# Patient Record
Sex: FEMALE | Race: AMERICAN INDIAN OR ALASKA NATIVE
[De-identification: names, ages, dates, MRNs, and addresses within clinical notes are randomized per-mention and may not be internally consistent; named-entity substitution may affect disease eponyms.]

---

## 2021-05-28 ENCOUNTER — HOSPITAL ENCOUNTER (INPATIENT)
Dept: HOSPITAL 43 - DL.MS | Age: 71
LOS: 33 days | Discharge: HOME | DRG: 862 | End: 2021-06-30
Attending: INTERNAL MEDICINE | Admitting: INTERNAL MEDICINE
Payer: COMMERCIAL

## 2021-05-28 DIAGNOSIS — G62.9: ICD-10-CM

## 2021-05-28 DIAGNOSIS — N18.9: ICD-10-CM

## 2021-05-28 DIAGNOSIS — I13.0: ICD-10-CM

## 2021-05-28 DIAGNOSIS — Z28.82: ICD-10-CM

## 2021-05-28 DIAGNOSIS — I48.91: ICD-10-CM

## 2021-05-28 DIAGNOSIS — Z86.16: ICD-10-CM

## 2021-05-28 DIAGNOSIS — D50.9: ICD-10-CM

## 2021-05-28 DIAGNOSIS — M06.9: ICD-10-CM

## 2021-05-28 DIAGNOSIS — T84.020D: Primary | ICD-10-CM

## 2021-05-28 DIAGNOSIS — Z87.891: ICD-10-CM

## 2021-05-28 DIAGNOSIS — Z88.5: ICD-10-CM

## 2021-05-28 DIAGNOSIS — Z86.73: ICD-10-CM

## 2021-05-28 DIAGNOSIS — I50.9: ICD-10-CM

## 2021-05-28 DIAGNOSIS — F32.9: ICD-10-CM

## 2021-05-28 DIAGNOSIS — J44.9: ICD-10-CM

## 2021-05-28 DIAGNOSIS — M81.0: ICD-10-CM

## 2021-05-28 DIAGNOSIS — Z88.8: ICD-10-CM

## 2021-05-28 DIAGNOSIS — E78.5: ICD-10-CM

## 2021-05-28 DIAGNOSIS — K21.9: ICD-10-CM

## 2021-05-28 DIAGNOSIS — Z90.49: ICD-10-CM

## 2021-05-28 DIAGNOSIS — Z79.82: ICD-10-CM

## 2021-05-28 DIAGNOSIS — F41.9: ICD-10-CM

## 2021-05-28 DIAGNOSIS — Z79.899: ICD-10-CM

## 2021-05-28 RX ADMIN — Medication SCH TAB: at 17:59

## 2021-05-28 NOTE — PCM.HP
H&P History of Present Illness





- General


Date of Service: 05/28/21


Admit Problem/Dx: 


                           Admission Diagnosis/Problem





Admission Diagnosis/Problem      Dislocation of hip joint prosthesis











- History of Present Illness


Initial Comments - Free Text/Narative: 





70F w/ pmh afib not on a/c, COPD, hx CVA, anxiety, osteoporosis, thoracic aortic

aneurysm s/p sleeve, AAA, HT, complicated course s/p R hip replacement leading 

to 8x dislocations s/p prior revision and now again s/p revision surger 5/24 

transferred to  for Swing Bed.  Pt is deconditioned and cannot ambulate much. 

Can stand w/ assistance.  Pain is minor.  No other complaints.





- Related Data


Allergies/Adverse Reactions: 


                                    Allergies











Allergy/AdvReac Type Severity Reaction Status Date / Time


 


adalimumab Allergy  Other Verified 05/28/21 10:39


 


oxycodone Allergy  Hyperactivi Verified 05/28/21 10:38





   ty  











Home Medications: 


                                    Home Meds





Acetaminophen 1,000 mg PO Q8HR 05/28/21 [History]


Albuterol/Ipratropium [DuoNeb 3.0-0.5 MG/3 ML] 1 ampule PO Q4HR PRN 05/28/21 

[History]


Aspirin [Ecotrin EC] 325 mg PO DAILY 05/28/21 [History]


Calcium Carbonate/Vitamin D3 [Calcium 500-Vit D3 200 Caplet] 2 tab PO BIDMEALS 

05/28/21 [History]


DULoxetine [Cymbalta] 60 mg PO BID 05/28/21 [History]


Ergocalciferol (Vitamin D2) [Vitamin D2] 50,000 unit PO .TUES.THURS 05/28/21 

[History]


Famotidine 10 mg PO BID PRN 05/28/21 [History]


Ferrous Sulfate 325 mg PO BIDMEALS 05/28/21 [History]


Folic Acid 1 mg PO DAILY 05/28/21 [History]


Furosemide [Lasix] 20 mg PO DAILY 05/28/21 [History]


Hydroxychloroquine [Plaquenil] 200 mg PO BID 05/28/21 [History]


Ibuprofen 800 mg PO Q8HR 05/28/21 [History]


Lidocaine 5% [Lidoderm 5%] 2 patch TOP Q24H 05/28/21 [History]


Loperamide [Imodium] 2 mg PO QID PRN 05/28/21 [History]


Meloxicam [Mobic] 15 mg PO BID 05/28/21 [History]


Metoprolol Tartrate 25 mg PO BID 05/28/21 [History]


Multivit-Min/FA/Lycopen/Lutein [Centrum Silver Tablet] 1 tab PO DAILY 05/28/21 

[History]


Pantoprazole Sodium [Protonix] 40 mg PO DAILY 05/28/21 [History]


Potassium Chloride [Klor-Con M20] 20 meq PO DAILY 05/28/21 [History]


Pregabalin [Lyrica] 150 mg PO BID 05/28/21 [History]


atorvaSTATin [Lipitor] 40 mg PO BEDTIME 05/28/21 [History]


carvediloL [Carvedilol] 6.25 mg PO BID 05/28/21 [History]


hydroCHLOROthiazide [Hydrochlorothiazide] 50 mg PO DAILY 05/28/21 [History]


lisinopriL [Lisinopril] 10 mg PO DAILY 05/28/21 [History]


oxyCODONE 5 mg PO Q6HR PRN 05/28/21 [History]


traMADol [Ultram] 50 mg PO Q6HR PRN 05/28/21 [History]











Past Medical History


Cardiovascular History: Reports: Afib, Heart Failure, Other (See Below)


Other Cardiovascular History: Tripple, sleeve on aorta


Respiratory History: Reports: COPD


Gastrointestinal History: Reports: GERD


Genitourinary History: Reports: Chronic Renal Insuffiency


OB/GYN History: Reports: Pregnancy


Musculoskeletal History: Reports: Arthritis, RA


Neurological History: Reports: TIA


Psychiatric History: Reports: Depression


Hematologic History: Reports: Anemia, Folic Acid, Iron Deficiency





- Infectious Disease History


Infectious Disease History: Reports: Other (See Below)


Other Infectious Disease History: COVID





- Past Surgical History


Cardiovascular Surgical History: Reports: None


Respiratory Surgical History: Reports: None


GI Surgical History: Reports: Appendectomy, Cholecystectomy


Female  Surgical History: Reports: None, Other (See Below)


Other Female  Surgeries/Procedures: Recurrent UTI


Musculoskeletal Surgical History: Reports: Hip Replacement, Joint Replacement, 

Other (See Below)


Other Musculoskeletal Surgeries/Procedures:: 8 dislocation of right hip





Social & Family History





- Tobacco Use


Tobacco Use Status *Q: Former Tobacco User


Years of Tobacco use: 40


Packs/Tins Daily: 1.5


Used Tobacco, but Quit: Yes


Month/Year Tobacco Last Used: 2020


Second Hand Smoke Exposure: No





- Caffeine Use


Caffeine Use: Reports: Coffee, Soda





- Recreational Drug Use


Recreational Drug Use: No





H&P Review of Systems





- Review of Systems:


Review Of Systems: See Below


General: Reports: Weakness.  Denies: Fever


HEENT: Denies: Headaches


Pulmonary: Denies: Shortness of Breath


Cardiovascular: Denies: Chest Pain, Edema


Gastrointestinal: Denies: Abdominal Pain, Nausea, Vomiting


Genitourinary: Denies: Dysuria


Musculoskeletal: Denies: Neck Pain


Skin: Denies: Jaundice


Psychiatric: Denies: Confusion


Neurological: Reports: Dizziness (at times)


Hematologic/Lymphatic: Denies: Easy Bleeding





Exam





- Exam


Exam: See Below





- Vital Signs


Vital Signs: 


                                Last Vital Signs











Temp  97.1 F   05/28/21 12:42


 


Pulse  66   05/28/21 12:42


 


Resp  18   05/28/21 12:42


 


BP  130/66   05/28/21 12:42


 


Pulse Ox  95   05/28/21 12:42











Weight: 161 lb 3.2 oz





- Exam


Quality Assessment: No: Supplemental Oxygen


General: Alert, Oriented


HEENT: Conjunctiva Clear


Neck: Supple


Lungs: Clear to Auscultation, Normal Respiratory Effort


Cardiovascular: Regular Rate, Regular Rhythm


GI/Abdominal Exam: Normal Bowel Sounds, Soft, Non-Tender, No Distention


Back Exam: Normal Inspection


Extremities: No Pedal Edema


Skin: Warm, Dry, Intact


Neurological: Normal Speech


Neuro Extensive - Mental Status: Alert, Oriented x3, Normal Mood/Affect


Neuro Extensive - Motor, Sensory, Reflexes: No: Tremor


Psychiatric: Alert, Normal Affect, Normal Mood


Physical Exam Comments:: 





R hip dressing clean, no visible bruising


Problem List Initiated/Reviewed/Updated: Yes


Orders Last 24hrs: 


                               Active Orders 24 hr











 Category Date Time Status


 


 Patient Status [ADT] Routine ADT  05/28/21 13:05 Ordered


 


 Oxygen Therapy [RC] PRN Care  05/28/21 13:05 Ordered


 


 RT Aerosol Therapy [RC] ASDIRECTED Care  05/28/21 13:18 Ordered


 


 Up With Assistance [RC] ASDIRECTED Care  05/28/21 13:05 Ordered


 


 VTE/DVT Education [RC] PER UNIT ROUTINE Care  05/28/21 13:05 Ordered


 


 Vital Signs [RC] Q4H Care  05/28/21 13:05 Ordered


 


 OT Evaluation and Treatment [CONS] Routine Cons  05/28/21 13:05 Ordered


 


 PT Evaluation and Treatment [CONS] Routine Cons  05/28/21 13:05 Ordered


 


 Heart Healthy Diet [DIET] Diet  05/28/21 Dinner Ordered


 


 CBC W/O DIFF,HEMOGRAM [HEME] AM Lab  05/29/21 05:11 Ordered


 


 Acetaminophen [Tylenol Extra Strength] Med  05/28/21 14:00 Ordered





 1,000 mg PO Q8HR   


 


 Albuterol/Ipratropium [DuoNeb 3.0-0.5 MG/3 ML] Med  05/28/21 13:08 Ordered





 1 ampule INH Q4HR PRN   


 


 Aspirin [Ecotrin] Med  05/29/21 09:00 Ordered





 325 mg PO DAILY   


 


 Calcium Carbonate/Vitamin D3 [Calcium Carbonate/Vitamin Med  05/28/21 18:00 

Ordered





 D 1250 MG - 5 MCG]   





 2 tab PO BIDMEALS   


 


 DULoxetine [Cymbalta] Med  05/28/21 21:00 Ordered





 60 mg PO BID   


 


 Enoxaparin [Lovenox] Med  05/29/21 09:00 Ordered





 40 mg SUBCUT DAILY   


 


 Ergocalciferol (Vitamin D2) [Vitamin D2] Med  05/28/21 13:15 Ordered





 50,000 unit PO .LUIS   


 


 Famotidine [Famotidine] Med  05/28/21 13:08 Ordered





 10 mg PO BID PRN   


 


 Ferrous Sulfate Med  05/28/21 18:00 Ordered





 325 mg PO BIDMEALS   


 


 Folic Acid Med  05/29/21 09:00 Ordered





 1 mg PO DAILY   


 


 Furosemide [Lasix] Med  05/29/21 09:00 Ordered





 20 mg PO DAILY   


 


 Hydroxychloroquine [Plaquenil] Med  05/28/21 21:00 Ordered





 200 mg PO BID   


 


 Lidocaine 5% [Lidoderm 5%] Med  05/28/21 13:15 Ordered





 2 patch TOP Q24H   


 


 Metoprolol Tartrate [Lopressor] Med  05/28/21 21:00 Ordered





 25 mg PO BID   


 


 Multivit-Min/FA/Lycopen/Lutein [Centrum Silver Tablet] Med  05/29/21 09:00 

Ordered





 1 tab PO DAILY   


 


 Pantoprazole [ProTONIX***] Med  05/29/21 09:00 Ordered





 40 mg PO DAILY   


 


 Potassium Chloride [Klor-Con M20] Med  05/29/21 09:00 Ordered





 20 meq PO DAILY   


 


 Pregabalin [Lyrica] Med  05/28/21 21:00 Ordered





 150 mg PO BID   


 


 atorvaSTATin [Lipitor] Med  05/28/21 21:00 Ordered





 40 mg PO BEDTIME   


 


 hydroCHLOROthiazide [Hydrochlorothiazide] Med  05/29/21 09:00 Ordered





 50 mg PO DAILY   


 


 lisinopriL [Prinivil] Med  05/29/21 09:00 Ordered





 10 mg PO DAILY   


 


 traMADol [Ultram] Med  05/28/21 13:14 Ordered





 50 mg PO Q6HR PRN   


 


 Resuscitation Status Routine Resus Stat  05/28/21 13:05 Ordered











Assessment/Plan Comment:: 





#s/p right hip prosthesis revision - uncomplicated course - mgt per PT/OT - pain

 control





#COPD / anxiety / HT / HL - will d/w daughter the med rec which states pt is on 

lopressor and coreg and daughter told nurse this is indeed true - appears to be 

a mistake to me





#afib - historically not a/c - daughter unclear why - they state pt was on 

eliquis for 3 mo then told to stop by PMD





PPX - LMWH

## 2021-05-29 RX ADMIN — POTASSIUM CHLORIDE SCH MEQ: 750 TABLET, FILM COATED, EXTENDED RELEASE ORAL at 09:24

## 2021-05-29 RX ADMIN — Medication SCH TAB: at 08:31

## 2021-05-29 RX ADMIN — Medication SCH TAB: at 09:24

## 2021-05-29 RX ADMIN — ASPIRIN SCH MG: 325 TABLET, DELAYED RELEASE ORAL at 09:25

## 2021-05-29 RX ADMIN — Medication SCH TAB: at 17:55

## 2021-05-30 RX ADMIN — Medication SCH TAB: at 09:28

## 2021-05-30 RX ADMIN — Medication SCH TAB: at 18:24

## 2021-05-30 RX ADMIN — ASPIRIN SCH MG: 325 TABLET, DELAYED RELEASE ORAL at 09:28

## 2021-05-30 RX ADMIN — POTASSIUM CHLORIDE SCH MEQ: 750 TABLET, FILM COATED, EXTENDED RELEASE ORAL at 09:28

## 2021-05-31 RX ADMIN — Medication SCH TAB: at 09:37

## 2021-05-31 RX ADMIN — ASPIRIN SCH MG: 325 TABLET, DELAYED RELEASE ORAL at 09:36

## 2021-05-31 RX ADMIN — POTASSIUM CHLORIDE SCH MEQ: 750 TABLET, FILM COATED, EXTENDED RELEASE ORAL at 09:37

## 2021-05-31 RX ADMIN — Medication SCH TAB: at 17:41

## 2021-06-01 LAB
ANION GAP SERPL CALC-SCNC: 10.9 MEQ/L (ref 7–13)
CHLORIDE SERPL-SCNC: 100 MMOL/L (ref 98–107)
SODIUM SERPL-SCNC: 138 MMOL/L (ref 136–145)

## 2021-06-01 RX ADMIN — Medication SCH TAB: at 09:15

## 2021-06-01 RX ADMIN — Medication SCH TAB: at 18:18

## 2021-06-01 RX ADMIN — Medication SCH TAB: at 08:18

## 2021-06-01 RX ADMIN — ERGOCALCIFEROL SCH MG: 1.25 CAPSULE ORAL at 09:13

## 2021-06-01 RX ADMIN — ASPIRIN SCH MG: 325 TABLET, DELAYED RELEASE ORAL at 09:14

## 2021-06-01 RX ADMIN — POTASSIUM CHLORIDE SCH MEQ: 750 TABLET, FILM COATED, EXTENDED RELEASE ORAL at 09:13

## 2021-06-02 RX ADMIN — Medication SCH TAB: at 18:43

## 2021-06-02 RX ADMIN — ASPIRIN SCH MG: 325 TABLET, DELAYED RELEASE ORAL at 09:13

## 2021-06-02 RX ADMIN — Medication SCH TAB: at 09:15

## 2021-06-02 RX ADMIN — Medication SCH TAB: at 09:13

## 2021-06-02 RX ADMIN — POTASSIUM CHLORIDE SCH MEQ: 750 TABLET, FILM COATED, EXTENDED RELEASE ORAL at 09:13

## 2021-06-03 RX ADMIN — Medication SCH TAB: at 11:08

## 2021-06-03 RX ADMIN — Medication SCH TAB: at 18:38

## 2021-06-03 RX ADMIN — ASPIRIN SCH MG: 325 TABLET, DELAYED RELEASE ORAL at 11:05

## 2021-06-03 RX ADMIN — POTASSIUM CHLORIDE SCH MEQ: 750 TABLET, FILM COATED, EXTENDED RELEASE ORAL at 11:06

## 2021-06-03 RX ADMIN — Medication SCH TAB: at 11:06

## 2021-06-03 RX ADMIN — ERGOCALCIFEROL SCH MG: 1.25 CAPSULE ORAL at 11:07

## 2021-06-04 RX ADMIN — ASPIRIN SCH MG: 325 TABLET, DELAYED RELEASE ORAL at 09:42

## 2021-06-04 RX ADMIN — POTASSIUM CHLORIDE SCH MEQ: 750 TABLET, FILM COATED, EXTENDED RELEASE ORAL at 09:44

## 2021-06-04 RX ADMIN — Medication SCH TAB: at 18:07

## 2021-06-04 RX ADMIN — Medication SCH TAB: at 09:41

## 2021-06-04 RX ADMIN — Medication SCH TAB: at 09:40

## 2021-06-04 NOTE — PCM.SN.2
- Free Text/Narrative


Note: 





START OF DOCTOR ANUSHA PROGRESS NOTE














Subjective:


The patient endorses no complaints at this time.  She denies pain of her right 

hip.  She denies fever, rigors, nausea, vomiting, cough, wheeze, abdominal pain,

chest pain, dyspnea.  She states that she has been having a good appetite and 

that she has been moving her bowels regularly.  She indicates that she has been 

working with physical therapy/occupational therapy and she is happy with her 

progress.  I explained to the patient her current medical condition and plan of 

care and I have answered all of her questions











Objective:





General:


-Alert


-No acute distress


-No dyspnea


-No tachypnea





Heart:


-Regular rate


-iRegular rhythm


-No murmurs


-No gallops


-No rubs





Lungs:


-No wheeze


-No rhonchi


-No rales





Abdomen:


-Normal bowel sounds in all four quadrants


-No rebound


-No guarding


-No tenderness





Extremities:


-2/4 pulse in all four extremities


-No clubbing


-No cyanosis


-No edema





Additional Details / Additional Findings / Exceptions / Miscellaneous:


The patient has 5 out of 5 right foot plantar flexion/dorsiflexion strength.  

The patient has 3 out of 5 left plantar flexion strength and 0-1 out of 5 left 

dorsiflexion strength.  Sensorium of left lower extremity is diminished compared

with right lower extremity











Pertinent Laboratory Results / Pertinent Radiology Results / Pertinent 

Diagnostic Results / Pertinent Vital Signs:


Swing bed status as patient status post right hip repair revision.  Lidocaine 5%

patch apply to affected area for 12 hours daily.  As needed analgesia.  Continue

physical therapy and Occupational Therapy





Iron deficiency anemia.  Ferrous sulfate 3-5 mils p.o. daily plus vitamin C 500 

mg p.o. daily





Atrial fibrillation.  Per previous documentation, anticoagulation discontinued 

by primary medical doctor.  Metoprolol 25 mg p.o. twice daily





COPD





History of CVA.  Aspirin 305 mg p.o. daily plus Lipitor 40 mg p.o. nightly





Anxiety





Osteoporosis





History of thoracic aortic aneurysm, status post sleeve





History of abdominal aortic aneurysm.  Outpatient monitoring with her primary 

care physician or provider





Hypertension.  Lasix 20 mg p.o. daily plus hydrochlorothiazide 50 mg p.o. daily 

plus K-Dur 20 McCugh p.o. daily plus lisinopril 10 mg p.o. daily plus metoprolol

25 mg p.o. twice daily





Depression.  Cymbalta 60 mg p.o. twice daily





GERD.  Mathew 40 mg p.o. daily





Rheumatoid arthritis.  Plaquenil 200 mg p.o. twice daily





Neuropathy.  Lyrica 150 mg p.o. twice daily





Hyperlipidemia.  Lipitor 40 mg p.o. nightly





CHF.  Lasix 20 mg p.o. daily plus hydrochlorothiazide 50 mg p.o. daily plus K-

Dur 20 McCugh p.o. daily plus lisinopril 10 mg p.o. daily plus metoprolol 25 mg 

p.o. twice daily





Query history of folate deficiency.  Folic acid 1 mg p.o. daily





DVT prophylaxis.  Lovenox 40 mg subcutaneously daily











Assessment / Plan:














Disposition:














END OF DOCTOR EMAMIS PROGRESS NOTE

## 2021-06-05 RX ADMIN — ASPIRIN SCH MG: 325 TABLET, DELAYED RELEASE ORAL at 09:30

## 2021-06-05 RX ADMIN — Medication SCH TAB: at 09:30

## 2021-06-05 RX ADMIN — Medication SCH TAB: at 18:17

## 2021-06-05 RX ADMIN — Medication SCH TAB: at 08:20

## 2021-06-05 RX ADMIN — POTASSIUM CHLORIDE SCH MEQ: 750 TABLET, FILM COATED, EXTENDED RELEASE ORAL at 09:31

## 2021-06-06 RX ADMIN — ASPIRIN SCH MG: 325 TABLET, DELAYED RELEASE ORAL at 08:54

## 2021-06-06 RX ADMIN — Medication SCH TAB: at 08:54

## 2021-06-06 RX ADMIN — POTASSIUM CHLORIDE SCH MEQ: 750 TABLET, FILM COATED, EXTENDED RELEASE ORAL at 08:54

## 2021-06-06 RX ADMIN — Medication SCH TAB: at 07:48

## 2021-06-06 RX ADMIN — Medication SCH TAB: at 17:29

## 2021-06-07 RX ADMIN — ASPIRIN SCH MG: 325 TABLET, DELAYED RELEASE ORAL at 08:42

## 2021-06-07 RX ADMIN — Medication SCH TAB: at 17:37

## 2021-06-07 RX ADMIN — Medication SCH TAB: at 08:41

## 2021-06-07 RX ADMIN — POTASSIUM CHLORIDE SCH MEQ: 750 TABLET, FILM COATED, EXTENDED RELEASE ORAL at 08:40

## 2021-06-07 RX ADMIN — Medication SCH TAB: at 08:43

## 2021-06-07 NOTE — PCM.SN.2
- Free Text/Narrative


Note: 





START OF DOCTOR EMAMIS PROGRESS NOTE














Subjective:


The patient endorses no complaints at this time.  She denies fever, rigors, 

nausea, vomiting, cough, wheeze, abdominal pain, chest pain, dyspnea, right hip 

pain, or any other constitutional complaints.  The patient Stefania that she has 

been eating well and has been having regular bowel movements.  Indicates that 

she has been participating with physical therapy/Occupational Therapy.  I 

explained to the patient her current medical condition and plan of care and have

answered all of her questions











Objective:





General:


-Alert


-No acute distress


-No dyspnea


-No tachypnea





Heart:


-Regular rate


-iRegular rhythm


-No murmurs


-No gallops


-No rubs





Lungs:


-No wheeze


-No rhonchi


-No rales





Abdomen:


-Normal bowel sounds in all four quadrants


-No rebound


-No guarding


-No tenderness





Extremities:


-2/4 pulse in all four extremities


-No clubbing


-No cyanosis


-No edema





Additional Details / Additional Findings / Exceptions / Miscellaneous:


The patient has 5 out of 5 right foot plantar flexion/dorsiflexion strength.  

The patient has 3 out of 5 left plantar flexion strength and 0-1 out of 5 left 

dorsiflexion strength.  Sensorium of left lower extremity is diminished compared

with right lower extremity











Pertinent Laboratory Results / Pertinent Radiology Results / Pertinent 

Diagnostic Results / Pertinent Vital Signs:


Vital signs stable











Assessment / Plan:


Swing bed status as patient status post right hip repair revision.  Lidocaine 5%

patch apply to affected area for 12 hours daily.  As needed analgesia.  Continue

physical therapy and Occupational Therapy





Iron deficiency anemia.  Ferrous sulfate 3-5 mils p.o. daily plus vitamin C 500 

mg p.o. daily





Atrial fibrillation.  Per previous documentation, anticoagulation discontinued 

by primary medical doctor.  Metoprolol 25 mg p.o. twice daily





COPD





History of CVA.  Aspirin 305 mg p.o. daily plus Lipitor 40 mg p.o. nightly





Anxiety





Osteoporosis





History of thoracic aortic aneurysm, status post sleeve





History of abdominal aortic aneurysm.  Outpatient monitoring with her primary 

care physician or provider





Hypertension.  Lasix 20 mg p.o. daily plus hydrochlorothiazide 50 mg p.o. daily 

plus K-Dur 20 McCugh p.o. daily plus lisinopril 10 mg p.o. daily plus metoprolol

25 mg p.o. twice daily





Depression.  Cymbalta 60 mg p.o. twice daily





GERD.  Mathew 40 mg p.o. daily





Rheumatoid arthritis.  Plaquenil 200 mg p.o. twice daily





Neuropathy.  Lyrica 150 mg p.o. twice daily





Hyperlipidemia.  Lipitor 40 mg p.o. nightly





CHF.  Lasix 20 mg p.o. daily plus hydrochlorothiazide 50 mg p.o. daily plus K-

Dur 20 McCugh p.o. daily plus lisinopril 10 mg p.o. daily plus metoprolol 25 mg 

p.o. twice daily





Query history of folate deficiency.  Folic acid 1 mg p.o. daily





DVT prophylaxis.  Lovenox 40 mg subcutaneously daily











Disposition:














END OF DOCTOR EMAMIS PROGRESS NOTE

## 2021-06-08 RX ADMIN — Medication SCH: at 21:18

## 2021-06-08 RX ADMIN — Medication SCH TAB: at 08:19

## 2021-06-08 RX ADMIN — ASPIRIN SCH MG: 325 TABLET, DELAYED RELEASE ORAL at 08:23

## 2021-06-08 RX ADMIN — Medication SCH TAB: at 08:21

## 2021-06-08 RX ADMIN — ERGOCALCIFEROL SCH MG: 1.25 CAPSULE ORAL at 08:23

## 2021-06-08 RX ADMIN — POTASSIUM CHLORIDE SCH MEQ: 750 TABLET, FILM COATED, EXTENDED RELEASE ORAL at 08:19

## 2021-06-09 RX ADMIN — POTASSIUM CHLORIDE SCH MEQ: 750 TABLET, FILM COATED, EXTENDED RELEASE ORAL at 08:55

## 2021-06-09 RX ADMIN — ASPIRIN SCH MG: 325 TABLET, DELAYED RELEASE ORAL at 08:58

## 2021-06-09 RX ADMIN — Medication SCH TAB: at 08:54

## 2021-06-09 RX ADMIN — Medication SCH TAB: at 17:48

## 2021-06-09 RX ADMIN — Medication SCH TAB: at 08:57

## 2021-06-10 RX ADMIN — Medication SCH TAB: at 19:05

## 2021-06-10 RX ADMIN — Medication SCH TAB: at 10:20

## 2021-06-10 RX ADMIN — ERGOCALCIFEROL SCH MG: 1.25 CAPSULE ORAL at 10:18

## 2021-06-10 RX ADMIN — Medication SCH TAB: at 10:16

## 2021-06-10 RX ADMIN — POTASSIUM CHLORIDE SCH MEQ: 750 TABLET, FILM COATED, EXTENDED RELEASE ORAL at 10:17

## 2021-06-10 RX ADMIN — ASPIRIN SCH MG: 325 TABLET, DELAYED RELEASE ORAL at 10:14

## 2021-06-11 RX ADMIN — POTASSIUM CHLORIDE SCH MEQ: 750 TABLET, FILM COATED, EXTENDED RELEASE ORAL at 10:37

## 2021-06-11 RX ADMIN — Medication SCH TAB: at 10:37

## 2021-06-11 RX ADMIN — Medication SCH TAB: at 18:17

## 2021-06-11 RX ADMIN — Medication SCH TAB: at 10:36

## 2021-06-11 RX ADMIN — ASPIRIN SCH MG: 325 TABLET, DELAYED RELEASE ORAL at 10:36

## 2021-06-12 RX ADMIN — Medication SCH TAB: at 17:09

## 2021-06-12 RX ADMIN — Medication SCH TAB: at 10:03

## 2021-06-12 RX ADMIN — Medication SCH TAB: at 10:02

## 2021-06-12 RX ADMIN — Medication SCH: at 17:37

## 2021-06-12 RX ADMIN — ASPIRIN SCH MG: 325 TABLET, DELAYED RELEASE ORAL at 10:03

## 2021-06-12 RX ADMIN — POTASSIUM CHLORIDE SCH MEQ: 750 TABLET, FILM COATED, EXTENDED RELEASE ORAL at 10:01

## 2021-06-13 RX ADMIN — Medication SCH TAB: at 17:41

## 2021-06-13 RX ADMIN — ASPIRIN SCH MG: 325 TABLET, DELAYED RELEASE ORAL at 09:15

## 2021-06-13 RX ADMIN — Medication SCH TAB: at 09:16

## 2021-06-13 RX ADMIN — POTASSIUM CHLORIDE SCH MEQ: 750 TABLET, FILM COATED, EXTENDED RELEASE ORAL at 09:16

## 2021-06-13 RX ADMIN — Medication SCH TAB: at 09:15

## 2021-06-14 RX ADMIN — Medication SCH TAB: at 17:34

## 2021-06-14 RX ADMIN — Medication SCH TAB: at 09:44

## 2021-06-14 RX ADMIN — Medication SCH TAB: at 11:18

## 2021-06-14 RX ADMIN — ASPIRIN SCH MG: 325 TABLET, DELAYED RELEASE ORAL at 09:43

## 2021-06-14 RX ADMIN — POTASSIUM CHLORIDE SCH MEQ: 750 TABLET, FILM COATED, EXTENDED RELEASE ORAL at 09:39

## 2021-06-14 NOTE — PCM.SN.2
- Free Text/Narrative


Note: 





START OF DOCTOR FROYLANMIS PROGRESS NOTE














Subjective:


The patient endorses no complaints at this time.  She denies pain of her right 

hip.  Denies fever, rigors, nausea, vomiting, cough, wheeze, abdominal pain, 

chest pain, lightheadedness, dizziness, dyspnea.  She states that she has been 

eating well.  She states that she has been having regular bowel movements.  She 

is happy with the progression she is experience with physical 

therapy/Occupational Therapy.  I explained to the patient her current medical 

condition and plan of care and I have answered all of her questions











Objective:





General:


-Alert


-No acute distress


-No dyspnea


-No tachypnea





Heart:


-Regular rate


-iRegular rhythm


-No murmurs


-No gallops


-No rubs





Lungs:


-No wheeze


-No rhonchi


-No rales





Abdomen:


-Normal bowel sounds in all four quadrants


-No rebound


-No guarding


-No tenderness





Extremities:


-2/4 pulse in all four extremities


-No clubbing


-No cyanosis


-No edema





Additional Details / Additional Findings / Exceptions / Miscellaneous:


The patient has 5 out of 5 right foot plantar flexion/dorsiflexion strength.  

The patient has 3 out of 5 left plantar flexion strength and 0-1 out of 5 left 

dorsiflexion strength.  Sensorium of left lower extremity is diminished compared

with right lower extremity











Pertinent Laboratory Results / Pertinent Radiology Results / Pertinent 

Diagnostic Results / Pertinent Vital Signs:


Vital signs stable











Assessment / Plan:


Swing bed status as patient status post right hip repair revision.  As needed 

analgesia.  Continue physical therapy and Occupational Therapy





Iron deficiency anemia.  Ferrous sulfate 3-5 mils p.o. daily plus vitamin C 500 

mg p.o. daily





Atrial fibrillation.  Per previous documentation, anticoagulation discontinued 

by primary medical doctor





COPD





History of CVA.  Aspirin 305 mg p.o. daily plus Lipitor 40 mg p.o. nightly





Anxiety





Osteoporosis





History of thoracic aortic aneurysm, status post sleeve





History of abdominal aortic aneurysm.  Outpatient monitoring with her primary 

care physician or provider





Hypertension.  Lasix 20 mg p.o. daily plus hydrochlorothiazide 50 mg p.o. daily 

plus K-Dur 20 McCugh p.o. daily





Depression.  Cymbalta 60 mg p.o. twice daily





GERD.  Mathew 40 mg p.o. daily





Rheumatoid arthritis.  Plaquenil 200 mg p.o. twice daily





Neuropathy.  Lyrica 150 mg p.o. twice daily





Hyperlipidemia.  Lipitor 40 mg p.o. nightly





CHF.  Lasix 20 mg p.o. daily plus hydrochlorothiazide 50 mg p.o. daily plus K-

Dur 20 McCugh p.o. daily





Query history of folate deficiency.  Folic acid 1 mg p.o. daily





DVT prophylaxis.  Lovenox 40 mg subcutaneously daily











Disposition:














END OF DOCTOR EMAMIS PROGRESS NOTE

## 2021-06-15 RX ADMIN — ASPIRIN SCH MG: 325 TABLET, DELAYED RELEASE ORAL at 08:15

## 2021-06-15 RX ADMIN — ERGOCALCIFEROL SCH MG: 1.25 CAPSULE ORAL at 08:15

## 2021-06-15 RX ADMIN — POTASSIUM CHLORIDE SCH MEQ: 750 TABLET, FILM COATED, EXTENDED RELEASE ORAL at 08:15

## 2021-06-15 RX ADMIN — Medication SCH TAB: at 08:15

## 2021-06-15 RX ADMIN — Medication SCH TAB: at 17:30

## 2021-06-16 RX ADMIN — Medication SCH TAB: at 09:52

## 2021-06-16 RX ADMIN — Medication SCH TAB: at 18:39

## 2021-06-16 RX ADMIN — ASPIRIN SCH MG: 325 TABLET, DELAYED RELEASE ORAL at 09:56

## 2021-06-16 RX ADMIN — Medication SCH TAB: at 09:56

## 2021-06-16 RX ADMIN — POTASSIUM CHLORIDE SCH MEQ: 750 TABLET, FILM COATED, EXTENDED RELEASE ORAL at 09:51

## 2021-06-17 RX ADMIN — ASPIRIN SCH MG: 325 TABLET, DELAYED RELEASE ORAL at 08:15

## 2021-06-17 RX ADMIN — POTASSIUM CHLORIDE SCH MEQ: 750 TABLET, FILM COATED, EXTENDED RELEASE ORAL at 08:16

## 2021-06-17 RX ADMIN — Medication SCH TAB: at 17:45

## 2021-06-17 RX ADMIN — Medication SCH TAB: at 08:16

## 2021-06-17 RX ADMIN — ERGOCALCIFEROL SCH MG: 1.25 CAPSULE ORAL at 08:15

## 2021-06-18 RX ADMIN — ASPIRIN SCH MG: 325 TABLET, DELAYED RELEASE ORAL at 09:07

## 2021-06-18 RX ADMIN — Medication SCH TAB: at 18:50

## 2021-06-18 RX ADMIN — POTASSIUM CHLORIDE SCH MEQ: 750 TABLET, FILM COATED, EXTENDED RELEASE ORAL at 09:08

## 2021-06-18 RX ADMIN — Medication SCH TAB: at 09:08

## 2021-06-19 RX ADMIN — Medication SCH TAB: at 08:18

## 2021-06-19 RX ADMIN — POTASSIUM CHLORIDE SCH MEQ: 750 TABLET, FILM COATED, EXTENDED RELEASE ORAL at 09:32

## 2021-06-19 RX ADMIN — Medication SCH TAB: at 09:34

## 2021-06-19 RX ADMIN — ASPIRIN SCH MG: 325 TABLET, DELAYED RELEASE ORAL at 09:34

## 2021-06-19 RX ADMIN — Medication SCH TAB: at 17:29

## 2021-06-20 RX ADMIN — POTASSIUM CHLORIDE SCH MEQ: 750 TABLET, FILM COATED, EXTENDED RELEASE ORAL at 08:50

## 2021-06-20 RX ADMIN — Medication SCH TAB: at 08:49

## 2021-06-20 RX ADMIN — Medication SCH TAB: at 08:50

## 2021-06-20 RX ADMIN — Medication SCH TAB: at 17:48

## 2021-06-20 RX ADMIN — ASPIRIN SCH MG: 325 TABLET, DELAYED RELEASE ORAL at 08:51

## 2021-06-21 RX ADMIN — ASPIRIN SCH MG: 325 TABLET, DELAYED RELEASE ORAL at 08:58

## 2021-06-21 RX ADMIN — Medication SCH TAB: at 19:32

## 2021-06-21 RX ADMIN — POTASSIUM CHLORIDE SCH MEQ: 750 TABLET, FILM COATED, EXTENDED RELEASE ORAL at 08:58

## 2021-06-21 RX ADMIN — Medication SCH TAB: at 08:58

## 2021-06-21 NOTE — PCM.PN
- General Info


Date of Service: 06/21/21


Subjective Update: 





The patient offers no complaints at this time.  She denies pain of her right 

hip.  She denies fever, rigors, nausea, vomiting, cough, wheeze, abdominal pain,

chest pain, dyspnea, or any other constitutional complaint.  The patient Stefania 

that she has been eating adequate amounts and that she is having regular bowel 

movements.  She states that she has been working with physical therapy and is 

satisfied with her progression.  I explained to the patient her current medical 

condition and plan of care and I have answered all of her questions





- Review of Systems


General: Reports: No Symptoms


HEENT: Reports: No Symptoms


Pulmonary: Reports: No Symptoms


Cardiovascular: Reports: No Symptoms


Gastrointestinal: Reports: No Symptoms


Genitourinary: Reports: No Symptoms


Musculoskeletal: Reports: No Symptoms


Skin: Reports: No Symptoms


Neurological: Reports: No Symptoms


Psychiatric: Reports: No Symptoms





- Patient Data


Vitals - Most Recent: 


                                Last Vital Signs











Temp  96.6 F L  06/20/21 21:25


 


Pulse  69   06/20/21 21:25


 


Resp  18   06/20/21 21:25


 


BP  132/70   06/20/21 21:25


 


Pulse Ox  94 L  06/20/21 21:25











Weight - Most Recent: 154 lb 12.8 oz


I&O - Last 24 Hours: 


                                 Intake & Output











 06/20/21 06/21/21 06/21/21





 22:59 06:59 14:59


 


Intake Total 450 400 


 


Balance 450 400 











Med Orders - Current: 


                               Current Medications





Acetaminophen (Acetaminophen 325 Mg Tab)  650 mg PO Q6H PRN


   PRN Reason: Pain


   Last Admin: 06/20/21 16:58 Dose:  650 mg


   Documented by: 


Albuterol/Ipratropium (Albuterol/Ipratropium 3.0-0.5 Mg/3 Ml Neb Soln)  3 ml INH

Q4HRRT PRN


   PRN Reason: Shortness of Breath


Ascorbic Acid (Ascorbic Acid 500 Mg Tab)  500 mg PO DAILY [facility]


   Last Admin: 06/20/21 08:51 Dose:  500 mg


   Documented by: 


Aspirin (Aspirin 325 Mg Tab.Ec)  325 mg PO DAILY [facility]


   Last Admin: 06/20/21 08:51 Dose:  325 mg


   Documented by: 


Atorvastatin Calcium (Atorvastatin 20 Mg Tab)  40 mg PO BEDTIME [facility]


   Last Admin: 06/20/21 21:25 Dose:  40 mg


   Documented by: 


Calcium Carbonate (Calcium Carbonate/Vitamin D3 1250 Mg-5 Mcg Tab)  2 tab PO 

BIDPC [facility]


   Last Admin: 06/20/21 17:48 Dose:  2 tab


   Documented by: 


Duloxetine HCl (Duloxetine 30 Mg Cap)  60 mg PO BID [facility]


   Last Admin: 06/20/21 21:25 Dose:  60 mg


   Documented by: 


Enoxaparin Sodium (Enoxaparin 40 Mg/0.4 Ml Syringe)  40 mg SUBCUT DAILY [facility]


   Last Admin: 06/20/21 08:52 Dose:  40 mg


   Documented by: 


Ergocalciferol (Ergocalciferol (Vitamin D2) 1.25 Mg Cap)  1.25 mg PO TuTh@0900 

[facility]


   Last Admin: 06/17/21 08:15 Dose:  1.25 mg


   Documented by: 


Famotidine (Famotidine 20 Mg Tab)  10 mg PO BID PRN


   PRN Reason: Heartburn


Ferrous Sulfate (Ferrous Sulfate 325 Mg Tab)  325 mg PO BIDMEALS [facility]


   Last Admin: 06/20/21 17:48 Dose:  325 mg


   Documented by: 


Folic Acid (Folic Acid 1 Mg Tab)  1 mg PO DAILY [facility]


   Last Admin: 06/20/21 08:50 Dose:  1 mg


   Documented by: 


Furosemide (Furosemide 20 Mg Tab)  20 mg PO DAILY [facility]


   Last Admin: 06/20/21 08:51 Dose:  20 mg


   Documented by: 


Hydrochlorothiazide (Hydrochlorothiazide 25 Mg Tab)  50 mg PO DAILY [facility]


   Last Admin: 06/20/21 08:49 Dose:  50 mg


   Documented by: 


Hydroxychloroquine Sulfate (Hydroxychloroquine 200 Mg Tab)  200 mg PO BID [facility]


   Last Admin: 06/20/21 21:25 Dose:  200 mg


   Documented by: 


Melatonin (Melatonin 3 Mg Tab)  3 mg PO BEDTIME PRN


   PRN Reason: Insomnia


   Last Admin: 06/20/21 21:26 Dose:  3 mg


   Documented by: 


Multivitamins/Minerals (Multivitamins, Therapeutic With Minerals Tab)  1 tab PO 

DAILY [facility]


   Last Admin: 06/20/21 08:50 Dose:  1 tab


   Documented by: 


Pantoprazole Sodium (Pantoprazole 40 Mg Tab.Cr)  40 mg PO DAILY [facility]


   Last Admin: 06/20/21 08:52 Dose:  40 mg


   Documented by: 


Potassium Chloride (Potassium Chloride 10 Meq Tab.Er)  20 meq PO DAILY [facility]


   Last Admin: 06/20/21 08:50 Dose:  20 meq


   Documented by: 


Pregabalin (Pregabalin 150 Mg Cap)  150 mg PO BID [facility]


   Last Admin: 06/20/21 21:25 Dose:  150 mg


   Documented by: 


Tramadol HCl (Tramadol 50 Mg Tab)  50 mg PO Q6HR PRN


   PRN Reason: Pain (moderate 4-6)


   Last Admin: 06/20/21 03:55 Dose:  50 mg


   Documented by: 





Discontinued Medications





Acetaminophen (Acetaminophen 500 Mg Tab)  1,000 mg PO Q8HR [facility]


   Last Admin: 06/04/21 05:22 Dose:  1,000 mg


   Documented by: 


Ferrous Sulfate (Ferrous Sulfate 325 Mg Tab)  325 mg PO BIDAC [facility]


   Last Admin: 05/28/21 15:50 Dose:  325 mg


   Documented by: 


Lidocaine (Lidocaine 5% 700 Mg Patch)  1,400 mg TOP DAILY [facility]


   Last Admin: 06/09/21 09:00 Dose:  Not Given


   Documented by: 


Lisinopril (Lisinopril 10 Mg Tab)  10 mg PO DAILY [facility]


   Last Admin: 06/09/21 08:57 Dose:  10 mg


   Documented by: 


Metoprolol Tartrate (Metoprolol Tartrate 25 Mg Tab)  25 mg PO BID [facility]


   Last Admin: 06/07/21 08:45 Dose:  25 mg


   Documented by: 


Miscellaneous Information (Remove Lidocaine Patches)  0 ea TRDERM BEDTIME [facility]


   Last Admin: 06/08/21 21:19 Dose:  Not Given


   Documented by: 











- Exam


General: Alert, Oriented


HEENT: Pupils Equal, Pupils Reactive, EOMI, Mucous Membr. Moist/Pink


Neck: Supple


Lungs: Clear to Auscultation, Normal Respiratory Effort


Cardiovascular: Regular Rate, Regular Rhythm


GI/Abdominal Exam: Normal Bowel Sounds, Soft, Non-Tender, No Organomegaly, No 

Distention, No Abnormal Bruit, No Mass, Pelvis Stable


Back Exam: Normal Inspection, Full Range of Motion


Extremities: Normal Inspection, Normal Range of Motion, Non-Tender, No Pedal 

Edema, Normal Capillary Refill


Skin: Warm, Dry, Intact


Wound/Incisions: Healing Well


Neurological: No New Focal Deficit


Psy/Mental Status: Alert, Normal Affect, Normal Mood





- Patient Data


Result Diagrams: 


                                 05/30/21 11:05





                                 06/01/21 08:33





Sepsis Event Note





- Evaluation


Sepsis Screening Result: No Definite Risk





- Focused Exam


Vital Signs: 


                                   Vital Signs











  Temp Pulse Resp BP Pulse Ox


 


 06/20/21 21:25  96.6 F L  69  18  132/70  94 L














- Problem List Review


Problem List Initiated/Reviewed/Updated: Yes





- Plan


Plan:: 


Swing bed status as patient status post right hip repair revision.  As needed 

analgesia.  Continue physical therapy and Occupational Therapy





Iron deficiency anemia.  Ferrous sulfate 3-5 mils p.o. daily plus vitamin C 500 

mg p.o. daily





Atrial fibrillation.  Per previous documentation, anticoagulation discontinued 

by primary medical doctor





COPD





History of CVA.  Aspirin 305 mg p.o. daily plus Lipitor 40 mg p.o. nightly





Anxiety





Osteoporosis





History of thoracic aortic aneurysm, status post sleeve





History of abdominal aortic aneurysm.  Outpatient monitoring with her primary 

care physician or provider





Hypertension.  Lasix 20 mg p.o. daily plus hydrochlorothiazide 50 mg p.o. daily 

plus K-Dur 20 McCugh p.o. daily





Depression.  Cymbalta 60 mg p.o. twice daily





GERD.  Mathew 40 mg p.o. daily





Rheumatoid arthritis.  Plaquenil 200 mg p.o. twice daily





Neuropathy.  Lyrica 150 mg p.o. twice daily





Hyperlipidemia.  Lipitor 40 mg p.o. nightly





CHF.  Lasix 20 mg p.o. daily plus hydrochlorothiazide 50 mg p.o. daily plus K-

Dur 20 McCugh p.o. daily





Query history of folate deficiency.  Folic acid 1 mg p.o. daily





DVT prophylaxis.  Lovenox 40 mg subcutaneously daily











Disposition:

## 2021-06-22 RX ADMIN — POTASSIUM CHLORIDE SCH MEQ: 750 TABLET, FILM COATED, EXTENDED RELEASE ORAL at 08:53

## 2021-06-22 RX ADMIN — Medication SCH TAB: at 08:52

## 2021-06-22 RX ADMIN — ERGOCALCIFEROL SCH MG: 1.25 CAPSULE ORAL at 08:52

## 2021-06-22 RX ADMIN — Medication SCH TAB: at 18:28

## 2021-06-22 RX ADMIN — ASPIRIN SCH MG: 325 TABLET, DELAYED RELEASE ORAL at 08:52

## 2021-06-23 RX ADMIN — ASPIRIN SCH MG: 325 TABLET, DELAYED RELEASE ORAL at 08:46

## 2021-06-23 RX ADMIN — Medication SCH TAB: at 08:46

## 2021-06-23 RX ADMIN — Medication SCH TAB: at 08:45

## 2021-06-23 RX ADMIN — Medication SCH TAB: at 17:29

## 2021-06-23 RX ADMIN — POTASSIUM CHLORIDE SCH MEQ: 750 TABLET, FILM COATED, EXTENDED RELEASE ORAL at 08:46

## 2021-06-24 RX ADMIN — Medication SCH TAB: at 17:48

## 2021-06-24 RX ADMIN — ASPIRIN SCH MG: 325 TABLET, DELAYED RELEASE ORAL at 10:42

## 2021-06-24 RX ADMIN — ERGOCALCIFEROL SCH MG: 1.25 CAPSULE ORAL at 10:42

## 2021-06-24 RX ADMIN — POTASSIUM CHLORIDE SCH MEQ: 750 TABLET, FILM COATED, EXTENDED RELEASE ORAL at 10:43

## 2021-06-24 RX ADMIN — Medication SCH TAB: at 10:42

## 2021-06-25 RX ADMIN — Medication SCH TAB: at 10:56

## 2021-06-25 RX ADMIN — ASPIRIN SCH MG: 325 TABLET, DELAYED RELEASE ORAL at 10:56

## 2021-06-25 RX ADMIN — Medication SCH TAB: at 10:58

## 2021-06-25 RX ADMIN — Medication SCH TAB: at 18:13

## 2021-06-25 RX ADMIN — POTASSIUM CHLORIDE SCH MEQ: 750 TABLET, FILM COATED, EXTENDED RELEASE ORAL at 10:58

## 2021-06-26 RX ADMIN — ASPIRIN SCH MG: 325 TABLET, DELAYED RELEASE ORAL at 09:27

## 2021-06-26 RX ADMIN — Medication SCH TAB: at 17:54

## 2021-06-26 RX ADMIN — Medication SCH TAB: at 09:27

## 2021-06-26 RX ADMIN — Medication SCH TAB: at 07:30

## 2021-06-26 RX ADMIN — POTASSIUM CHLORIDE SCH MEQ: 750 TABLET, FILM COATED, EXTENDED RELEASE ORAL at 09:29

## 2021-06-27 RX ADMIN — ASPIRIN SCH MG: 325 TABLET, DELAYED RELEASE ORAL at 08:53

## 2021-06-27 RX ADMIN — POTASSIUM CHLORIDE SCH MEQ: 750 TABLET, FILM COATED, EXTENDED RELEASE ORAL at 08:53

## 2021-06-27 RX ADMIN — Medication SCH TAB: at 08:52

## 2021-06-27 RX ADMIN — Medication SCH TAB: at 08:51

## 2021-06-27 RX ADMIN — Medication SCH TAB: at 17:41

## 2021-06-28 RX ADMIN — Medication SCH TAB: at 09:32

## 2021-06-28 RX ADMIN — POTASSIUM CHLORIDE SCH MEQ: 750 TABLET, FILM COATED, EXTENDED RELEASE ORAL at 09:32

## 2021-06-28 RX ADMIN — Medication SCH TAB: at 07:52

## 2021-06-28 RX ADMIN — Medication SCH TAB: at 18:46

## 2021-06-28 RX ADMIN — ASPIRIN SCH MG: 325 TABLET, DELAYED RELEASE ORAL at 09:32

## 2021-06-28 NOTE — PCM.PN
- General Info


Date of Service: 06/28/21


Subjective Update: 





The patient offers no complaints at this time.  She states that her right hip 

pain is well controlled.  She denies fever, rigors, nausea, vomiting, cough, 

wheeze, abdominal pain, chest pain, dyspnea, lightheadedness, dizziness.  She 

indicates that she has been having a good appetite and that she is having 

regular bowel movements.  She indicates that she has been working with physical 

therapy and Occupational Therapy and is satisfied with her progress.  I ex

plained to the patient her current medical condition and plan of care and have 

answered all of her questions


Functional Status: Reports: Pain Controlled





- Review of Systems


HEENT: Reports: No Symptoms


Pulmonary: Reports: No Symptoms


Cardiovascular: Reports: No Symptoms


Gastrointestinal: Reports: No Symptoms


Genitourinary: Reports: No Symptoms


Musculoskeletal: Reports: No Symptoms


Skin: Reports: No Symptoms


Neurological: Reports: No Symptoms


Psychiatric: Reports: No Symptoms





- Patient Data


Vitals - Most Recent: 


                                Last Vital Signs











Temp  97.1 F   06/27/21 21:06


 


Pulse  93   06/27/21 21:06


 


Resp  20   06/27/21 21:06


 


BP  134/75   06/27/21 21:06


 


Pulse Ox  99   06/27/21 21:06











Weight - Most Recent: 164 lb 3.2 oz


I&O - Last 24 Hours: 


                                 Intake & Output











 06/27/21 06/28/21 06/28/21





 22:59 06:59 14:59


 


Intake Total 400  


 


Balance 400  











Med Orders - Current: 


                               Current Medications





Acetaminophen (Acetaminophen 325 Mg Tab)  650 mg PO Q6H PRN


   PRN Reason: Pain


   Last Admin: 06/27/21 20:24 Dose:  650 mg


   Documented by: 


Albuterol/Ipratropium (Albuterol/Ipratropium 3.0-0.5 Mg/3 Ml Neb Soln)  3 ml INH

Q4HRRT PRN


   PRN Reason: Shortness of Breath


Ascorbic Acid (Ascorbic Acid 500 Mg Tab)  500 mg PO DAILY [facility]


   Last Admin: 06/27/21 08:52 Dose:  500 mg


   Documented by: 


Aspirin (Aspirin 325 Mg Tab.Ec)  325 mg PO DAILY [facility]


   Last Admin: 06/27/21 08:53 Dose:  325 mg


   Documented by: 


Atorvastatin Calcium (Atorvastatin 20 Mg Tab)  40 mg PO BEDTIME [facility]


   Last Admin: 06/27/21 20:25 Dose:  40 mg


   Documented by: 


Calcium Carbonate (Calcium Carbonate/Vitamin D3 1250 Mg-5 Mcg Tab)  2 tab PO 

BIDPC [facility]


   Last Admin: 06/28/21 07:52 Dose:  2 tab


   Documented by: 


Duloxetine HCl (Duloxetine 30 Mg Cap)  60 mg PO BID [facility]


   Last Admin: 06/27/21 20:25 Dose:  60 mg


   Documented by: 


Enoxaparin Sodium (Enoxaparin 40 Mg/0.4 Ml Syringe)  40 mg SUBCUT DAILY [facility]


   Last Admin: 06/27/21 08:53 Dose:  40 mg


   Documented by: 


Ergocalciferol (Ergocalciferol (Vitamin D2) 1.25 Mg Cap)  1.25 mg PO TuTh@0900 

[facility]


   Last Admin: 06/24/21 10:42 Dose:  1.25 mg


   Documented by: 


Famotidine (Famotidine 20 Mg Tab)  10 mg PO BID PRN


   PRN Reason: Heartburn


Ferrous Sulfate (Ferrous Sulfate 325 Mg Tab)  325 mg PO BIDMEALS [facility]


   Last Admin: 06/28/21 07:52 Dose:  325 mg


   Documented by: 


Folic Acid (Folic Acid 1 Mg Tab)  1 mg PO DAILY [facility]


   Last Admin: 06/27/21 08:52 Dose:  1 mg


   Documented by: 


Furosemide (Furosemide 20 Mg Tab)  20 mg PO DAILY [facility]


   Last Admin: 06/27/21 08:53 Dose:  20 mg


   Documented by: 


Hydrochlorothiazide (Hydrochlorothiazide 25 Mg Tab)  50 mg PO DAILY [facility]


   Last Admin: 06/27/21 08:53 Dose:  50 mg


   Documented by: 


Hydroxychloroquine Sulfate (Hydroxychloroquine 200 Mg Tab)  200 mg PO BID [facility]


   Last Admin: 06/27/21 20:26 Dose:  200 mg


   Documented by: 


Melatonin (Melatonin 3 Mg Tab)  3 mg PO BEDTIME PRN


   PRN Reason: Insomnia


   Last Admin: 06/27/21 20:24 Dose:  3 mg


   Documented by: 


Multivitamins/Minerals (Multivitamins, Therapeutic With Minerals Tab)  1 tab PO 

DAILY [facility]


   Last Admin: 06/27/21 08:52 Dose:  1 tab


   Documented by: 


Pantoprazole Sodium (Pantoprazole 40 Mg Tab.Cr)  40 mg PO DAILY [facility]


   Last Admin: 06/27/21 08:53 Dose:  40 mg


   Documented by: 


Potassium Chloride (Potassium Chloride 10 Meq Tab.Er)  20 meq PO DAILY [facility]


   Last Admin: 06/27/21 08:53 Dose:  20 meq


   Documented by: 


Pregabalin (Pregabalin 150 Mg Cap)  150 mg PO BID [facility]


   Last Admin: 06/27/21 20:25 Dose:  150 mg


   Documented by: 


Tramadol HCl (Tramadol 50 Mg Tab)  50 mg PO Q6HR PRN


   PRN Reason: Pain (moderate 4-6)


   Last Admin: 06/20/21 03:55 Dose:  50 mg


   Documented by: 





Discontinued Medications





Acetaminophen (Acetaminophen 500 Mg Tab)  1,000 mg PO Q8HR [facility]


   Last Admin: 06/04/21 05:22 Dose:  1,000 mg


   Documented by: 


Ferrous Sulfate (Ferrous Sulfate 325 Mg Tab)  325 mg PO BIDAC [facility]


   Last Admin: 05/28/21 15:50 Dose:  325 mg


   Documented by: 


Lidocaine (Lidocaine 5% 700 Mg Patch)  1,400 mg TOP DAILY [facility]


   Last Admin: 06/09/21 09:00 Dose:  Not Given


   Documented by: 


Lisinopril (Lisinopril 10 Mg Tab)  10 mg PO DAILY [facility]


   Last Admin: 06/09/21 08:57 Dose:  10 mg


   Documented by: 


Metoprolol Tartrate (Metoprolol Tartrate 25 Mg Tab)  25 mg PO BID [facility]


   Last Admin: 06/07/21 08:45 Dose:  25 mg


   Documented by: 


Miscellaneous Information (Remove Lidocaine Patches)  0 ea TRDERM BEDTIME [facility]


   Last Admin: 06/08/21 21:19 Dose:  Not Given


   Documented by: 











- Exam


General: Alert, Oriented


HEENT: Pupils Equal, Pupils Reactive, EOMI, Mucous Membr. Moist/Pink


Neck: Supple


Lungs: Clear to Auscultation, Normal Respiratory Effort


Cardiovascular: Regular Rate, Regular Rhythm


GI/Abdominal Exam: Normal Bowel Sounds, Soft, Non-Tender, No Organomegaly, No 

Distention, No Abnormal Bruit, No Mass, Pelvis Stable


Back Exam: Normal Inspection


Extremities: Normal Inspection, Normal Range of Motion, Non-Tender, No Pedal 

Edema, Normal Capillary Refill


Peripheral Pulses: 2+: Carotid (L), Carotid (R), Brachial (L), Brachial (R), 

Radial (L), Radial (R), Femoral (L), Femoral (R), Popliteal (L), Popliteal (R), 

Posterior Tibial (L), Posterior Tibial (R), Dorsalis Pedis (L), Dorsalis Pedis 

(R)


Skin: Warm, Dry, Intact


Wound/Incisions: Healing Well


Neurological: No New Focal Deficit


Psy/Mental Status: Alert, Normal Affect, Normal Mood





- Patient Data


Result Diagrams: 


                                 05/30/21 11:05





                                 06/01/21 08:33





Sepsis Event Note





- Evaluation


Sepsis Screening Result: No Definite Risk





- Focused Exam


Vital Signs: 


                                   Vital Signs











  Temp Pulse Resp BP Pulse Ox


 


 06/27/21 21:06  97.1 F  93  20  134/75  99














- Problem List Review


Problem List Initiated/Reviewed/Updated: Yes





- Plan


Plan:: 


Swing bed status as patient status post right hip repair revision.  As needed 

analgesia.  Continue physical therapy and Occupational Therapy





Iron deficiency anemia.  Ferrous sulfate 3-5 mils p.o. daily plus vitamin C 500 

mg p.o. daily





Atrial fibrillation.  Per previous documentation, anticoagulation discontinued 

by primary medical doctor





COPD





History of CVA.  Aspirin 305 mg p.o. daily plus Lipitor 40 mg p.o. nightly





Anxiety





Osteoporosis





History of thoracic aortic aneurysm, status post sleeve





History of abdominal aortic aneurysm.  Outpatient monitoring with her primary 

care physician or provider





Hypertension.  Lasix 20 mg p.o. daily plus hydrochlorothiazide 50 mg p.o. daily 

plus K-Dur 20 McCugh p.o. daily





Depression.  Cymbalta 60 mg p.o. twice daily





GERD.  Mathew 40 mg p.o. daily





Rheumatoid arthritis.  Plaquenil 200 mg p.o. twice daily





Neuropathy.  Lyrica 150 mg p.o. twice daily





Hyperlipidemia.  Lipitor 40 mg p.o. nightly





CHF.  Lasix 20 mg p.o. daily plus hydrochlorothiazide 50 mg p.o. daily plus K-

Dur 20 McCugh p.o. daily





Query history of folate deficiency.  Folic acid 1 mg p.o. daily





DVT prophylaxis.  Lovenox 40 mg subcutaneously daily











Disposition:

## 2021-06-29 RX ADMIN — ASPIRIN SCH MG: 325 TABLET, DELAYED RELEASE ORAL at 08:32

## 2021-06-29 RX ADMIN — ERGOCALCIFEROL SCH MG: 1.25 CAPSULE ORAL at 08:31

## 2021-06-29 RX ADMIN — Medication SCH TAB: at 08:31

## 2021-06-29 RX ADMIN — Medication SCH TAB: at 18:07

## 2021-06-29 RX ADMIN — Medication SCH TAB: at 08:32

## 2021-06-29 RX ADMIN — POTASSIUM CHLORIDE SCH MEQ: 750 TABLET, FILM COATED, EXTENDED RELEASE ORAL at 08:32

## 2021-06-30 RX ADMIN — Medication SCH TAB: at 08:12

## 2021-06-30 RX ADMIN — ASPIRIN SCH MG: 325 TABLET, DELAYED RELEASE ORAL at 08:12

## 2021-06-30 RX ADMIN — POTASSIUM CHLORIDE SCH MEQ: 750 TABLET, FILM COATED, EXTENDED RELEASE ORAL at 08:12

## 2021-06-30 NOTE — [id number]
**Discharge Summary





- Hospital Course


Free Text/Narrative:: 





START OF DOCTOR EMAMIS DISCHARGE SUMMARY














Date of Admission:


May 28, 2021








Date of Discharge:


9:11 AM on June 30, 2021











Primary Diagnosis:


Status post rehabilitation and swing bed status as the patient is status post 

right hip repair revision








Secondary Diagnosis:


Iron deficiency anemia





Atrial fibrillation





COPD





History of CVA





Anxiety





Osteoporosis





History of thoracic aortic aneurysm, status post sleeve





History of abdominal aortic aneurysm for which the patient require monitoring 

with her primary care physician or provider





Hypertension





Depression





GERD





Rheumatoid arthritis





Neuropathy





Hyperlipidemia





CHF





Query history of folate deficiency











Consultations:


None











Condition on Discharge:


Fair











Disposition:


The patient was advised to follow-up with her primary care physician or provider

10 to 14 days post discharge for posthospitalization evaluation and for 

monitoring of her history of abdominal aortic aneurysm





The patient is advised follow-up with orthopedic surgery as directed











Discharge Medications:


DuoNeb every 4 hours as needed shortness of breath or wheeze





Lisinopril 10 mg p.o. daily





Tylenol 650 mg p.o. every 6 hours as needed mild pain





Vitamin C 500 mg p.o. daily





Calcium carbonate/vitamin D: 500 mg / 200 IU: 2 tabs p.o. twice daily





Lipitor 40 mg p.o. nightly





Aspirin 325 mg p.o. daily





Pepcid 10 mg p.o. twice daily





Vitamin D 50,000 international unit p.o. q. Tuesday and Thursday





Cymbalta 60 mg p.o. twice daily





Oxycodone 5 mg p.o. every 6 hours as needed pain





Mobic 15 mg p.o. twice daily





Ultram 50 mg p.o. every 6 hours as needed moderate pain





Lyrica 150 mg p.o. twice daily





K-Dur 20 McCugh p.o. daily





Protonix 40 mg p.o. daily





Multivitamin 1 tab p.o. daily





Plaquenil 200 mg p.o. twice daily





Hydrochlorothiazide 50 mg p.o. daily





Folic acid 1 mg p.o. daily





Ferrous sulfate 325 mg p.o. twice daily











END OF DOCTOR EMAMIS DISCHARGE SUMMARY





- Discharge Data


Discharge Date: 06/30/21


Discharge Disposition: Home, Self-Care 01


Condition: Fair





- Referral to Home Health


Primary Care Physician: 


PCP Not In Area








- Patient Summary/Data


Consults: 


                                  Consultations





05/28/21 13:05


OT Evaluation and Treatment [CONS] Routine 


PT Evaluation and Treatment [CONS] Routine 














- Patient Instructions


Diet: Heart Healthy Diet


Activity: As Tolerated





- Discharge Plan


Prescriptions/Med Rec: 


Ascorbic Acid [Vitamin C] 500 mg PO DAILY 30 Days #30 tablet


Home Medications: 


                                    Home Meds





Albuterol/Ipratropium [DuoNeb 3.0-0.5 MG/3 ML] 1 ampule PO Q4HR PRN 05/28/21 

[History]


Aspirin [Ecotrin EC] 325 mg PO DAILY 05/28/21 [History]


Calcium Carbonate/Vitamin D3 [Calcium 500-Vit D3 200 Caplet] 2 tab PO BIDMEALS 

05/28/21 [History]


DULoxetine [Cymbalta] 60 mg PO BID 05/28/21 [History]


Ergocalciferol (Vitamin D2) [Vitamin D2] 50,000 unit PO .TUES.THURS 05/28/21 

[History]


Famotidine 10 mg PO BID PRN 05/28/21 [History]


Ferrous Sulfate 325 mg PO BIDMEALS 05/28/21 [History]


Folic Acid 1 mg PO DAILY 05/28/21 [History]


Hydroxychloroquine [Plaquenil] 200 mg PO BID 05/28/21 [History]


Meloxicam [Mobic] 15 mg PO BID 05/28/21 [History]


Multivit-Min/FA/Lycopen/Lutein [Centrum Silver Tablet] 1 tab PO DAILY 05/28/21 

[History]


Pantoprazole Sodium [Protonix] 40 mg PO DAILY 05/28/21 [History]


Potassium Chloride [Klor-Con M20] 20 meq PO DAILY 05/28/21 [History]


Pregabalin [Lyrica] 150 mg PO BID 05/28/21 [History]


atorvaSTATin [Lipitor] 40 mg PO BEDTIME 05/28/21 [History]


hydroCHLOROthiazide [Hydrochlorothiazide] 50 mg PO DAILY 05/28/21 [History]


lisinopriL [Lisinopril] 10 mg PO DAILY 05/28/21 [History]


oxyCODONE 5 mg PO Q6HR PRN 05/28/21 [History]


traMADol [Ultram] 50 mg PO Q6HR PRN 05/28/21 [History]


Acetaminophen [Tylenol] 650 mg PO Q6H PRN  tablet 06/30/21 [Rx]


Ascorbic Acid [Vitamin C] 500 mg PO DAILY 30 Days #30 tablet 06/30/21 [Rx]








Patient Handouts:  Exercises To Do While Sitting, Weakness, Easy-to-Read





- Discharge Summary/Plan Comment


DC Time >30 min.: Yes





- General Info


Functional Status: Reports: Pain Controlled





- Review of Systems


General: Reports: No Symptoms


HEENT: Reports: No Symptoms


Pulmonary: Reports: No Symptoms


Cardiovascular: Reports: No Symptoms


Gastrointestinal: Reports: No Symptoms


Genitourinary: Reports: No Symptoms


Musculoskeletal: Reports: No Symptoms


Skin: Reports: No Symptoms


Neurological: Reports: No Symptoms


Psychiatric: Reports: No Symptoms





- Patient Data


Vitals - Most Recent: 


                                Last Vital Signs











Temp  97.1 F   06/30/21 08:00


 


Pulse  74   06/30/21 08:00


 


Resp  20   06/30/21 08:00


 


BP  135/86   06/29/21 20:00


 


Pulse Ox  94 L  06/30/21 08:00











Weight - Most Recent: 164 lb 9.6 oz


I&O - Last 24 hours: 


                                 Intake & Output











 06/29/21 06/30/21 06/30/21





 22:59 06:59 14:59


 


Intake Total 600 350 


 


Balance 600 350 











Med Orders - Current: 


                               Current Medications





Acetaminophen (Acetaminophen 325 Mg Tab)  650 mg PO Q6H PRN


   PRN Reason: Pain


   Last Admin: 06/29/21 21:34 Dose:  650 mg


   Documented by: 


Albuterol/Ipratropium (Albuterol/Ipratropium 3.0-0.5 Mg/3 Ml Neb Soln)  3 ml INH

 Q4HRRT PRN


   PRN Reason: Shortness of Breath


Ascorbic Acid (Ascorbic Acid 500 Mg Tab)  500 mg PO DAILY [facility]


   Last Admin: 06/30/21 08:12 Dose:  500 mg


   Documented by: 


Aspirin (Aspirin 325 Mg Tab.Ec)  325 mg PO DAILY [facility]


   Last Admin: 06/30/21 08:12 Dose:  325 mg


   Documented by: 


Atorvastatin Calcium (Atorvastatin 20 Mg Tab)  40 mg PO BEDTIME [facility]


   Last Admin: 06/29/21 21:33 Dose:  40 mg


   Documented by: 


Calcium Carbonate (Calcium Carbonate/Vitamin D3 1250 Mg-5 Mcg Tab)  2 tab PO 

BIDPC [facility]


   Last Admin: 06/30/21 08:12 Dose:  2 tab


   Documented by: 


Duloxetine HCl (Duloxetine 30 Mg Cap)  60 mg PO BID [facility]


   Last Admin: 06/30/21 08:12 Dose:  60 mg


   Documented by: 


Enoxaparin Sodium (Enoxaparin 40 Mg/0.4 Ml Syringe)  40 mg SUBCUT DAILY [facility]


   Last Admin: 06/30/21 08:16 Dose:  Not Given


   Documented by: 


Ergocalciferol (Ergocalciferol (Vitamin D2) 1.25 Mg Cap)  1.25 mg PO TuTh@0900 

[facility]


   Last Admin: 06/29/21 08:31 Dose:  1.25 mg


   Documented by: 


Famotidine (Famotidine 20 Mg Tab)  10 mg PO BID PRN


   PRN Reason: Heartburn


Ferrous Sulfate (Ferrous Sulfate 325 Mg Tab)  325 mg PO BIDMEALS [facility]


   Last Admin: 06/30/21 08:12 Dose:  325 mg


   Documented by: 


Folic Acid (Folic Acid 1 Mg Tab)  1 mg PO DAILY [facility]


   Last Admin: 06/30/21 08:12 Dose:  1 mg


   Documented by: 


Furosemide (Furosemide 20 Mg Tab)  20 mg PO DAILY [facility]


   Last Admin: 06/30/21 08:12 Dose:  20 mg


   Documented by: 


Hydrochlorothiazide (Hydrochlorothiazide 25 Mg Tab)  50 mg PO DAILY [facility]


   Last Admin: 06/30/21 08:12 Dose:  50 mg


   Documented by: 


Hydroxychloroquine Sulfate (Hydroxychloroquine 200 Mg Tab)  200 mg PO BID [facility]


   Last Admin: 06/30/21 08:12 Dose:  200 mg


   Documented by: 


Melatonin (Melatonin 3 Mg Tab)  3 mg PO BEDTIME PRN


   PRN Reason: Insomnia


   Last Admin: 06/29/21 21:35 Dose:  3 mg


   Documented by: 


Multivitamins/Minerals (Multivitamins, Therapeutic With Minerals Tab)  1 tab PO 

DAILY [facility]


   Last Admin: 06/30/21 08:12 Dose:  1 tab


   Documented by: 


Pantoprazole Sodium (Pantoprazole 40 Mg Tab.Cr)  40 mg PO DAILY [facility]


   Last Admin: 06/30/21 08:12 Dose:  40 mg


   Documented by: 


Potassium Chloride (Potassium Chloride 10 Meq Tab.Er)  20 meq PO DAILY [facility]


   Last Admin: 06/30/21 08:12 Dose:  20 meq


   Documented by: 


Pregabalin (Pregabalin 150 Mg Cap)  150 mg PO BID [facility]


   Last Admin: 06/30/21 08:12 Dose:  150 mg


   Documented by: 


Tramadol HCl (Tramadol 50 Mg Tab)  50 mg PO Q6HR PRN


   PRN Reason: Pain (moderate 4-6)


   Last Admin: 06/20/21 03:55 Dose:  50 mg


   Documented by: 





Discontinued Medications





Acetaminophen (Acetaminophen 500 Mg Tab)  1,000 mg PO Q8HR [facility]


   Last Admin: 06/04/21 05:22 Dose:  1,000 mg


   Documented by: 


Ferrous Sulfate (Ferrous Sulfate 325 Mg Tab)  325 mg PO BIDAC [facility]


   Last Admin: 05/28/21 15:50 Dose:  325 mg


   Documented by: 


Lidocaine (Lidocaine 5% 700 Mg Patch)  1,400 mg TOP DAILY [facility]


   Last Admin: 06/09/21 09:00 Dose:  Not Given


   Documented by: 


Lisinopril (Lisinopril 10 Mg Tab)  10 mg PO DAILY [facility]


   Last Admin: 06/09/21 08:57 Dose:  10 mg


   Documented by: 


Metoprolol Tartrate (Metoprolol Tartrate 25 Mg Tab)  25 mg PO BID [facility]


   Last Admin: 06/07/21 08:45 Dose:  25 mg


   Documented by: 


Miscellaneous Information (Remove Lidocaine Patches)  0 ea TRDERM BEDTIME [facility]


   Last Admin: 06/08/21 21:19 Dose:  Not Given


   Documented by: 











- Exam


General: Reports: Alert, Oriented


HEENT: Reports: Pupils Equal, Pupils Reactive, EOMI, Mucous Membr. Moist/Pink


Neck: Reports: Supple


Lungs: Reports: Clear to Auscultation, Normal Respiratory Effort


Cardiovascular: Reports: Regular Rate, Regular Rhythm


GI/Abdominal Exam: Normal Bowel Sounds, Soft, Non-Tender, No Organomegaly, No 

Distention, No Abnormal Bruit, No Mass, Pelvis Stable


Back Exam: Reports: Normal Inspection, Full Range of Motion


Extremities: Normal Inspection, Normal Range of Motion, Non-Tender, No Pedal 

Edema, Normal Capillary Refill


Skin: Reports: Warm, Dry, Intact


Wound/Incisions: Reports: Healing Well


Neurological: Reports: No New Focal Deficit


Psy/Mental Status: Reports: Alert, Normal Affect, Normal Mood